# Patient Record
Sex: MALE | Race: WHITE | NOT HISPANIC OR LATINO | URBAN - METROPOLITAN AREA
[De-identification: names, ages, dates, MRNs, and addresses within clinical notes are randomized per-mention and may not be internally consistent; named-entity substitution may affect disease eponyms.]

---

## 2018-11-30 PROBLEM — R55 SYNCOPE AND COLLAPSE: Status: ACTIVE | Noted: 2018-11-30

## 2018-11-30 PROBLEM — N17.9 AKI (ACUTE KIDNEY INJURY): Status: ACTIVE | Noted: 2018-11-30

## 2018-11-30 PROBLEM — E78.5 DYSLIPIDEMIA: Status: ACTIVE | Noted: 2018-11-30

## 2018-11-30 PROBLEM — G47.30 SLEEP APNEA: Status: ACTIVE | Noted: 2018-11-30

## 2018-11-30 PROBLEM — I10 ESSENTIAL (PRIMARY) HYPERTENSION: Status: ACTIVE | Noted: 2018-11-30

## 2018-12-02 PROBLEM — I48.92 ATRIAL FLUTTER: Status: ACTIVE | Noted: 2018-12-02

## 2024-05-29 DIAGNOSIS — J06.9 UPPER RESPIRATORY TRACT INFECTION, UNSPECIFIED TYPE: Primary | ICD-10-CM

## 2024-05-31 ENCOUNTER — OFFICE VISIT (OUTPATIENT)
Dept: INFECTIOUS DISEASES | Facility: CLINIC | Age: 79
End: 2024-05-31
Payer: MEDICARE

## 2024-05-31 VITALS — WEIGHT: 219 LBS | BODY MASS INDEX: 28.12 KG/M2 | RESPIRATION RATE: 20 BRPM

## 2024-05-31 DIAGNOSIS — J06.9 UPPER RESPIRATORY TRACT INFECTION, UNSPECIFIED TYPE: Primary | ICD-10-CM

## 2024-05-31 PROCEDURE — 1159F MED LIST DOCD IN RCRD: CPT | Mod: CPTII,S$GLB,, | Performed by: INTERNAL MEDICINE

## 2024-05-31 PROCEDURE — 87632 RESP VIRUS 6-11 TARGETS: CPT | Performed by: INTERNAL MEDICINE

## 2024-05-31 PROCEDURE — 99203 OFFICE O/P NEW LOW 30 MIN: CPT | Mod: S$GLB,,, | Performed by: INTERNAL MEDICINE

## 2024-05-31 PROCEDURE — 99999 PR PBB SHADOW E&M-EST. PATIENT-LVL III: CPT | Mod: PBBFAC,,, | Performed by: INTERNAL MEDICINE

## 2024-05-31 PROCEDURE — 1101F PT FALLS ASSESS-DOCD LE1/YR: CPT | Mod: CPTII,S$GLB,, | Performed by: INTERNAL MEDICINE

## 2024-05-31 PROCEDURE — 3288F FALL RISK ASSESSMENT DOCD: CPT | Mod: CPTII,S$GLB,, | Performed by: INTERNAL MEDICINE

## 2024-05-31 PROCEDURE — 1126F AMNT PAIN NOTED NONE PRSNT: CPT | Mod: CPTII,S$GLB,, | Performed by: INTERNAL MEDICINE

## 2024-05-31 PROCEDURE — 1160F RVW MEDS BY RX/DR IN RCRD: CPT | Mod: CPTII,S$GLB,, | Performed by: INTERNAL MEDICINE

## 2024-05-31 RX ORDER — BENZONATATE 100 MG/1
100 CAPSULE ORAL 3 TIMES DAILY PRN
Qty: 30 CAPSULE | Refills: 0 | Status: SHIPPED | OUTPATIENT
Start: 2024-05-31 | End: 2024-05-31

## 2024-05-31 RX ORDER — BENZONATATE 100 MG/1
100 CAPSULE ORAL 3 TIMES DAILY PRN
Qty: 30 CAPSULE | Refills: 0 | Status: SHIPPED | OUTPATIENT
Start: 2024-05-31 | End: 2024-06-06 | Stop reason: SDUPTHER

## 2024-05-31 NOTE — PROGRESS NOTES
Patient ID: Yonatan Macias is a 78 y.o. male.    Subjective:           Chief Complaint: Referral and URI      HPI    Referred to this clinic due to upper respiratory infection related to travel    Patient went to [place] May 4th and came back on the 11th. The same day of return he developed cogestion nose, chest  - Cough, dry.   - Fevers for 3 days  - Diarrhea. intermittent   - Went to  on 5/17/24. Covid -, GAS- Rapid Flu- RSV-. Rxd Steroids  - Went ST ED on 5/19/24. Chest Xr Normal. Rx Doxycycline.       Interval HPI:   - Tells me that he ahs been getting better since, little by little.   - Coughing now, still dry.   - Mild sore throat.           Past Medical History:   Diagnosis Date    Anticoagulant long-term use     Coronary artery disease     Hypertension     Sleep apnea     uses bipap at hs       Past Surgical History:   Procedure Laterality Date    CARDIAC SURGERY      abelino bui MD    INSERTION OF IMPLANTABLE LOOP RECORDER N/A 3/17/2023    Procedure: Insertion, Implantable Loop Recorder;  Surgeon: Cruz Sanchez MD;  Location: [facility] CATH;  Service: Cardiology;  Laterality: N/A;    REMOVAL OF IMPLANTABLE LOOP RECORDER N/A 3/17/2023    Procedure: Removal/Insertion Loop Recorder;  Surgeon: Cruz Sanchez MD;  Location: [facility] CATH;  Service: Cardiology;  Laterality: N/A;       Review of patient's allergies indicates:  No Known Allergies    No family history on file.    Social History     Socioeconomic History    Marital status:    Tobacco Use    Smoking status: Never     Passive exposure: Never    Smokeless tobacco: Never   Substance and Sexual Activity    Alcohol use: Yes     Alcohol/week: 8.0 standard drinks of alcohol     Types: 8 Shots of liquor per week     Comment: every couple of days approx 8 drinks a week    Drug use: No   Social History Narrative    ** Merged History Encounter **            Current Outpatient Medications   Medication Instructions    acetaminophen (TYLENOL) 650 mg, Oral,  Every 8 hours PRN    atorvastatin (LIPITOR) 40 mg, Oral, Daily    benzonatate (TESSALON) 100 mg, Oral, 3 times daily PRN    biotin 1 mg Cap 1 capsule, Oral, 2 times daily    cetirizine (ZYRTEC) 10 mg, Oral, Nightly    clindamycin (CLEOCIN T) 1 % lotion Topical (Top), 2 times daily    clopidogreL (PLAVIX) 75 mg, Oral, Daily    co-enzyme Q-10 50 mg, Oral, Nightly    cyanocobalamin (VITAMIN B-12) 100 mcg, Oral, Nightly    finasteride (PROSCAR) 5 mg, Oral, Nightly    flaxseed oiL 1,000 mg Cap 1 capsule, Oral, Daily    FLUoxetine 60 mg, Oral, Daily    fluticasone (FLONASE) 50 mcg/actuation nasal spray 1 spray, Each Nostril, Nightly    nitroGLYCERIN (NITROSTAT) 0.4 mg, Sublingual, Every 5 min PRN    omega-3 fatty acids/fish oil (FISH OIL-OMEGA-3 FATTY ACIDS) 300-1,000 mg capsule 1 capsule, Oral, 2 times daily    propranoloL (INDERAL) 20 mg, Oral, Nightly    tamsulosin (FLOMAX) 0.4 mg, Oral, Nightly    topiramate (TOPAMAX) 100 mg, Oral, 2 times daily    vit C,B-Os-ltbyr-lutein-zeaxan (PRESERVISION AREDS-2) 250-90-40-1 mg Cap 1 capsule, Oral, 2 times daily         Review of Systems   Constitutional:  Negative for activity change, chills, diaphoresis, fatigue, fever and unexpected weight change.   HENT:  Positive for sore throat.    Eyes:  Negative for photophobia and visual disturbance.   Respiratory:  Positive for cough. Negative for shortness of breath.    Cardiovascular:  Negative for chest pain and leg swelling.   Gastrointestinal:  Negative for abdominal distention, abdominal pain, nausea and vomiting.   Genitourinary:  Negative for difficulty urinating, dysuria and flank pain.   Musculoskeletal:  Negative for arthralgias.   Skin:  Negative for color change and rash.   Allergic/Immunologic: Negative for immunocompromised state.   Neurological:  Negative for dizziness and headaches.   Psychiatric/Behavioral:  The patient is not nervous/anxious.            Objective:     Vitals:    05/31/24 0930   Resp: 20       Body  mass index is 28.12 kg/m².         Physical Exam  Vitals reviewed.   Constitutional:       General: He is not in acute distress.     Appearance: Normal appearance. He is well-developed. He is not ill-appearing.   HENT:      Head: Normocephalic and atraumatic.      Right Ear: External ear normal.      Left Ear: External ear normal.      Nose: Nose normal.   Eyes:      General: No scleral icterus.        Right eye: No discharge.         Left eye: No discharge.      Pupils: Pupils are equal, round, and reactive to light.   Cardiovascular:      Rate and Rhythm: Normal rate and regular rhythm.      Heart sounds: Normal heart sounds. No murmur heard.  Pulmonary:      Effort: Pulmonary effort is normal. No respiratory distress.      Breath sounds: Normal breath sounds. No wheezing.   Abdominal:      General: There is no distension.      Palpations: Abdomen is soft.      Tenderness: There is no abdominal tenderness.   Musculoskeletal:         General: Normal range of motion.      Cervical back: Normal range of motion and neck supple. No rigidity.   Lymphadenopathy:      Cervical: No cervical adenopathy.   Skin:     General: Skin is warm and dry.      Coloration: Skin is not jaundiced.   Neurological:      Mental Status: He is alert and oriented to person, place, and time.   Psychiatric:         Mood and Affect: Mood normal.         Speech: Speech normal.         Behavior: Behavior normal.         Judgment: Judgment normal.           Assessment:           Yonatan was seen today for referral and uri.    Diagnoses and all orders for this visit:    Upper respiratory tract infection, unspecified type  -     CBC Auto Differential; Future  -     Comprehensive Metabolic Panel; Future  -     Procalcitonin; Future  -     Discontinue: benzonatate (TESSALON) 100 MG capsule; Take 1 capsule (100 mg total) by mouth 3 (three) times daily as needed for Cough.  -     Respiratory Viral Panel by PCR (Sources other than NP Swab) [facility]  Swab; Future  -     Respiratory Viral Panel by PCR (Sources other than NP Swab) [facility]; Nasal Swab  -     Respiratory Viral Panel by PCR (Sources other than NP Swab) [facility]; Nasal Swab  -     benzonatate (TESSALON) 100 MG capsule; Take 1 capsule (100 mg total) by mouth 3 (three) times daily as needed for Cough.         Plan:     Patient has been improving.  I think patient's presentation is likely viral in nature.    Concern for possible secondary bacterial infection will request procalcitonin CBC and CMP.    Interested in respiratory panel by PCR for educational purposes  Will follow-up with patient regarding results.     Greater than 30 minutes was spent on this encounter, which included: review of recent encounters, review and interpretation of labs/images, obtaining pertinent history, performing a physical examination, counseling and educating the patient/family/caregiver, ordering medications/tests, documenting in the electronic health record, and coordinating care with necessary providers.    Bj Gong MD  Infectious Diseases

## 2024-06-05 LAB
ENTEROVIRUS/RHINOVIRUS: NOT DETECTED
HUMAN BOCAVIRUS: NOT DETECTED
HUMAN CORONAVIRUS, COMMON COLD VIRUS: NOT DETECTED
INFLUENZA A - H1N1-09: NOT DETECTED
PARAINFLUENZA: NOT DETECTED
RVP - ADENOVIRUS: NOT DETECTED
RVP - HUMAN METAPNEUMOVIRUS (HMPV): NOT DETECTED
RVP - INFLUENZA A: NOT DETECTED
RVP - INFLUENZA B: NOT DETECTED
RVP - RESPIRATORY SYNCTIAL VIRUS (RSV) A: NOT DETECTED
RVP - RESPIRATORY VIRAL PANEL, SOURCE: NORMAL

## 2024-06-06 ENCOUNTER — TELEPHONE (OUTPATIENT)
Dept: INFECTIOUS DISEASES | Facility: CLINIC | Age: 79
End: 2024-06-06
Payer: MEDICARE

## 2024-06-06 RX ORDER — BENZONATATE 100 MG/1
100 CAPSULE ORAL 3 TIMES DAILY PRN
Qty: 30 CAPSULE | Refills: 0 | Status: SHIPPED | OUTPATIENT
Start: 2024-06-06 | End: 2024-06-16

## 2024-06-06 RX ORDER — AZITHROMYCIN 250 MG/1
TABLET, FILM COATED ORAL
Qty: 6 TABLET | Refills: 0 | Status: SHIPPED | OUTPATIENT
Start: 2024-06-06 | End: 2024-06-11

## 2024-06-06 NOTE — TELEPHONE ENCOUNTER
Dr. Gong called pt, pt states cough is worse, rx for abx and refill on Tessalon pearls for he and wife to their pharmacy.

## 2024-06-06 NOTE — TELEPHONE ENCOUNTER
----- Message from Steffen Young, Patient Care Assistant sent at 6/6/2024 12:25 PM CDT -----  Contact: Pt  Type: Needs Medical Advice    Who Called: Pt  Best Call Back Number: [phone]  Inquiry/Question: Pt is calling to advise he is coughing nightly and getting worse not any better. Please advise Thank you~

## 2024-06-16 RX ORDER — FLUTICASONE PROPIONATE 110 UG/1
2 AEROSOL, METERED RESPIRATORY (INHALATION) 2 TIMES DAILY
Qty: 12 G | Refills: 12 | Status: SHIPPED | OUTPATIENT
Start: 2024-06-16 | End: 2024-06-18 | Stop reason: SDUPTHER

## 2024-06-16 RX ORDER — ALBUTEROL SULFATE 90 UG/1
2 AEROSOL, METERED RESPIRATORY (INHALATION) 2 TIMES DAILY
Qty: 18 G | Refills: 6 | Status: SHIPPED | OUTPATIENT
Start: 2024-06-16

## 2024-06-16 RX ORDER — FLUTICASONE PROPIONATE 220 UG/1
2 AEROSOL, METERED RESPIRATORY (INHALATION) 2 TIMES DAILY
Qty: 12 G | Refills: 6 | Status: SHIPPED | OUTPATIENT
Start: 2024-06-16 | End: 2024-06-16

## 2024-06-18 DIAGNOSIS — J06.9 UPPER RESPIRATORY TRACT INFECTION, UNSPECIFIED TYPE: Primary | ICD-10-CM

## 2024-06-18 RX ORDER — FLUTICASONE PROPIONATE 110 UG/1
2 AEROSOL, METERED RESPIRATORY (INHALATION) 2 TIMES DAILY
Qty: 12 G | Refills: 0 | Status: SHIPPED | OUTPATIENT
Start: 2024-06-18 | End: 2024-07-20